# Patient Record
(demographics unavailable — no encounter records)

---

## 2025-06-26 NOTE — IMAGING
[de-identified] : left elbow-  skin intact  erythema +warmth JUANY NVID  xrays- 3 views of the left elbow taken today- no acute changes

## 2025-06-26 NOTE — ASSESSMENT
Contacted patient to let her know that gel injections have been denied.  Scheduled patient f/u visit 3 months from last CSI.  Patient verbalized understanding of new appt date, time and location.    [FreeTextEntry1] : The patient was advised of the diagnosis. The natural history of the pathology was explained in full to the patient in layman's terms. All questions were answered. The risks and benefits of surgical and non-surgical treatment alternatives were explained in full to the patient.  Risk for hospitalization and loss of elbow function  Keflex 300 mg and Bactrim 800-160 rx provided  Discussed the immediate need to go to ER for IV abx if signs/symptoms develop or worsen.   RTO 4 days

## 2025-06-26 NOTE — HISTORY OF PRESENT ILLNESS
[de-identified] : 6/26/25: left elbow- red, warm, swollen. been taking keflex qid - getting worse no DM, immune system normal  RHD, real estate

## 2025-07-15 NOTE — IMAGING
[de-identified] : left elbow-  skin intact  no erythema no increased warmth no ttp minimal swelling and induration of the olecranon bursa (likely hematoma) FAROM with 5/5 strength. NVID   6/26/2025- xrays- 3 views of the left elbow taken today- no acute changes

## 2025-07-15 NOTE — ASSESSMENT
[FreeTextEntry1] : The patient was advised of the diagnosis. The natural history of the pathology was explained in full to the patient in layman's terms. All questions were answered. The risks and benefits of surgical and non-surgical treatment alternatives were explained in full to the patient.    -infection resolved. Pt doing well and will rto prn.

## 2025-07-15 NOTE — HISTORY OF PRESENT ILLNESS
[de-identified] : 7/15/2025: pt here for f/u of a left elbow cellulitis. Pt has completed course of abx (Keflex followed by Clindamycin and Bactrim) and currently has no pain.   6/26/25: left elbow- red, warm, swollen. been taking keflex qid - getting worse no DM, immune system normal  RHD, real estate